# Patient Record
Sex: MALE | Race: WHITE | NOT HISPANIC OR LATINO | URBAN - METROPOLITAN AREA
[De-identification: names, ages, dates, MRNs, and addresses within clinical notes are randomized per-mention and may not be internally consistent; named-entity substitution may affect disease eponyms.]

---

## 2020-03-02 ENCOUNTER — HISTORICAL (OUTPATIENT)
Dept: ADMINISTRATIVE | Facility: HOSPITAL | Age: 65
End: 2020-03-02

## 2020-04-30 ENCOUNTER — HISTORICAL (OUTPATIENT)
Dept: RADIOLOGY | Facility: HOSPITAL | Age: 65
End: 2020-04-30

## 2020-04-30 LAB
BUN SERPL-MCNC: 20 MG/DL (ref 7–18)
CALCIUM SERPL-MCNC: 9.9 MG/DL (ref 8.5–10.1)
CHLORIDE SERPL-SCNC: 106 MMOL/L (ref 98–107)
CO2 SERPL-SCNC: 29 MMOL/L (ref 21–32)
CREAT SERPL-MCNC: 1.4 MG/DL (ref 0.6–1.3)
CREAT/UREA NIT SERPL: 14
GLUCOSE SERPL-MCNC: 156 MG/DL (ref 74–106)
POTASSIUM SERPL-SCNC: 4 MMOL/L (ref 3.5–5.1)
SODIUM SERPL-SCNC: 141 MMOL/L (ref 136–145)

## 2020-08-26 ENCOUNTER — HISTORICAL (OUTPATIENT)
Dept: ADMINISTRATIVE | Facility: HOSPITAL | Age: 65
End: 2020-08-26

## 2020-11-03 ENCOUNTER — HISTORICAL (OUTPATIENT)
Dept: ADMINISTRATIVE | Facility: HOSPITAL | Age: 65
End: 2020-11-03

## 2020-11-05 ENCOUNTER — HISTORICAL (OUTPATIENT)
Dept: SURGERY | Facility: HOSPITAL | Age: 65
End: 2020-11-05

## 2020-11-09 ENCOUNTER — HISTORICAL (OUTPATIENT)
Dept: ADMINISTRATIVE | Facility: HOSPITAL | Age: 65
End: 2020-11-09

## 2021-05-06 ENCOUNTER — HISTORICAL (OUTPATIENT)
Dept: ADMINISTRATIVE | Facility: HOSPITAL | Age: 66
End: 2021-05-06

## 2021-05-20 ENCOUNTER — HISTORICAL (OUTPATIENT)
Dept: ADMINISTRATIVE | Facility: HOSPITAL | Age: 66
End: 2021-05-20

## 2021-10-18 ENCOUNTER — HISTORICAL (OUTPATIENT)
Dept: ADMINISTRATIVE | Facility: HOSPITAL | Age: 66
End: 2021-10-18

## 2022-04-10 ENCOUNTER — HISTORICAL (OUTPATIENT)
Dept: ADMINISTRATIVE | Facility: HOSPITAL | Age: 67
End: 2022-04-10

## 2022-04-30 VITALS
HEIGHT: 74 IN | WEIGHT: 172.81 LBS | BODY MASS INDEX: 22.18 KG/M2 | BODY MASS INDEX: 24.38 KG/M2 | HEIGHT: 72 IN | DIASTOLIC BLOOD PRESSURE: 109 MMHG | BODY MASS INDEX: 23.74 KG/M2 | WEIGHT: 182.56 LBS | BODY MASS INDEX: 23.43 KG/M2 | HEIGHT: 74 IN | WEIGHT: 185 LBS | WEIGHT: 180 LBS | DIASTOLIC BLOOD PRESSURE: 94 MMHG | SYSTOLIC BLOOD PRESSURE: 144 MMHG | SYSTOLIC BLOOD PRESSURE: 156 MMHG | HEIGHT: 74 IN

## 2022-04-30 NOTE — OP NOTE
DATE OF SURGERY:    11/05/2020    SURGEON:  Marques Ryan MD    attending physician:  Marques Ryan MD    PREOPERATIVE DIAGNOSIS:  Carpometacarpal arthritis, right hand.    POSTOPERATIVE DIAGNOSIS:  Carpometacarpal arthritis, right hand.    PROCEDURES:    1. Right hand trapeziectomy.    2. K-wire suspension 1st metacarpal to 2nd metacarpal.    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  He presents for trapeziectomy and stabilization of the 1st metacarpal with K-wire.    ANESTHESIA:  General.    COMPLICATIONS:  None.    PROCEDURE IN DETAIL:  The patient was placed under LMA anesthesia, prepped and draped in the usual sterile fashion.  An Esmarch was used to exsanguinate the right upper extremity.  Tourniquet was inflated to 250 mmHg.  I made a dorsal radial incision using a 15 blade scalpel across the CMC joint of the right thumb.  The tendons were retracted.  The dorsal branch of the radial artery was retracted.  The trapezium was then sharply delineated out and a K-wire was driven into the trapezium to use as a joystick under fluoroscopic guidance.  We used a 15 blade scalpel to circumferentially isolate the trapezium.  Sharp and blunt dissection were used to remove in its entirety.  The trapezium was removed without fracturing it.  After this was completed, we then obtained a shot fluoroscopically of the removed bone and it was removed in its entirety.  We set a 4.5 K-wire and drove it from the 1st metacarpal and the 2nd metacarpal to stabilize the CMC joint and stabilize the 1st metacarpal as it healed.  Once this was done, the K-wire was then bent.  The deep tissues were closed as well as permanent suture used to close the skin.  A thumb spica     splint was applied.  The tourniquet was released.  Fingers were pink at the end of the case.  I was scrubbed and present for the key portions of the  procedure.        ______________________________  MD ÁNGEL Ashton/RADHA  DD:  11/05/2020  Time:  09:31AM  DT:  11/05/2020  Time:  09:53AM  Job #:  [id number]

## 2022-04-30 NOTE — H&P
Patient:   Mathew Wilson             MRN: [id number]            FIN: [id number]               Age:   65 years     Sex:  Male     :  1955   Associated Diagnoses:   None   Author:   Orlando Celaya      Patient is here for surgery today.  There is no changes in the history and physical exam from the previous documented H&P.  Plan to proceed with surgery as previously discussed.    Orlando Celaya

## 2022-04-30 NOTE — OP NOTE
Patient:   Mathew Wilson             MRN: [id number]            FIN: [id number]               Age:   65 years     Sex:  Male     :  1955   Associated Diagnoses:   None   Author:   Shiv Hill MD       Phacoemulsification of Cataract with Intraocular Implant   Preoperative Diagnosis: Cataract right eye   Postoperative Diagnosis : Cataract right eye  Surgeon: Shiv Hill MD  Assistant: TIFFANY Jolly  Anestheisa: MAC  Complications: None  After the patient underwent topical anesthesia along with IV sedation in the holding area, the patient was brought to the operating suite. The patient was prepped and draped in a sterile fashion. A pediatric tegaderm and a lid speculum were used to retract the upper and lower lashes and lids.  A 1.0mm paracentesis was then made at the 11 oclock position. Intraocular non-preserved 1% Xylocaine (4% diluted down to 1%) was irrigated into the anterior chamber. Trypan blue dye was used to stain the anterior capsule then Endocoat was injected into the eye. A clear corneal incision was made with a 2.4 Keratome blade. A 4.75mm circular capsulotomy was then made with a pre bent 30 gauge needle and BSS was used to hydro dissect the nucleus from the capsule. The nucleus was then phacoemulsified with the Abbott machine for a EFX of (29_). The cortex was then removed with the I/A hand piece and Helon was placed into the posterior bag of the eye. An posterior chamber implant (ZCB00_) of power (18.5_) was placed in the capsular bag. The Helon was then removed from the eye with the I/A hand piece . The anterior chamber was inflated with BSS and the wound was checked for leaks. The lid speculum was removed and a drop of Besivance was placed in the operative eye. The patient was brought to the recovery suite in stable condition.

## 2022-04-30 NOTE — OP NOTE
Patient:   Mathew Wilson             MRN: [id number]            FIN: [id number]               Age:   65 years     Sex:  Male     :  1955   Associated Diagnoses:   None   Author:   Shiv Hill MD       Phacoemulsification of Cataract with Intraocular Implant   Preoperative Diagnosis: Cataract left eye   Postoperative Diagnosis : Cataract left eye  Surgeon: Shiv Hill MD  Assistant: TIFFANY Jolly  Anestheisa: MAC  Complications: None    After the patient underwent topical anesthesia along with IV sedation in the holding area, the patient was brought to the operating suite. The patient prepped and draped in a sterile fashion. A pediatric tegaderm and a lid speculum were used to retract the upper and lower lashes and lids.  A 1.0mm paracentesis was then made at the 5 oclock and 11 oclock position. Intraocular non-preserved 1% Xylocaine (4% diluted down to 1%) was irrigated into the anterior chamber. Trypan blue dye was used to stain the anterior capsule then Endocoat was injected into the eye. A clear corneal incision was made with a 2.4 Keratome blade. A 4.75mm circular capsulotomy was then made with a pre bent 30 gauge needle and BSS was used to hydro dissect the nucleus from the capsule. The nucleus was then phacoemulsified with the Abbott machine for a EFX of (35_). The cortex was then removed with the I/A hand piece and Helon was placed into the posterior bag of the eye. An posterior chamber implant (ZCB00_) of power (18.0_) was placed in the capsular bag. The Helon was then removed from the eye with the I/A hand piece . The anterior chamber was inflated with BSS and the wound was checked for leaks. The lid speculum was removed and a drop of Besivance was placed in the operative eye. The patient was brought to the recovery suite in stable condition.

## 2022-05-02 NOTE — HISTORICAL [facility] CERNER
This is a historical note converted from Deshaun. Formatting and pictures may have been removed.  Please reference Deshaun for original formatting and attached multimedia. Chief Complaint  Post op of Left Hand/ Issues  History of Present Illness  Mr Wilson returns to clinic today?for follow-up assessment of his left?first CMC?arthritis trapeziectomy?and first metacarpal pinning. ?This is performed by Dr. Morris less than a week ago.? Patient reports that over the weekend he had swelling and some tenderness in the left hand. ?He came to clinic today for assessment?to ensure there is nothing wrong with his operation.  Review of Systems  Constitutional:?no fever, fatigue, weakness  Eye:?no vision loss, eye redness, drainage, or pain  ENMT:?no sore throat, ear pain, sinus pain/congestion, nasal congestion/drainage  Respiratory:?no cough, no wheezing, no shortness of breath  Cardiovascular:?no chest pain, no palpitations, no edema  Gastrointestinal:?no nausea, vomiting, or diarrhea. No abdominal pain  ?  ?  Physical Exam  Vitals & Measurements  HR:?108(Peripheral)? BP:?144/94?  HT:?188.00?cm? WT:?82.800?kg? BMI:?23.43?  Left hand: The patient neurovascularly intact distally in the left hand. ?Limited range of motion secondary to immobilization?and postsurgical changes. ?Wound is clean dry and intact no evidence of infection.? Single K wire in place.  ?  X-ray left hand:?Status post trapeziectomy?and pinning of the first metacarpal.? No interval change from intraoperative fluoroscopic images.  Assessment/Plan  Pain?R52  ?Patient believes that his dressing may have been put on too tight at the time of surgery?and was not accommodating his swelling. ?He reports that when the dressing was removed he felt significant pain relief.? We will replace a dressing on the patients hand today. ?Encourage patient to return to clinic if he has any other ongoing issues or concerns otherwise I would like to see him back at his  regularly scheduled postop follow-up visit.  Ordered:  XR Wrist Left Minimum 3 Views, Routine, 11/09/20 10:10:00 CST, None, Ambulatory, Rad Type, Pain, Not Scheduled, 11/09/20 10:10:00 CST  ?   Problem List/Past Medical History  Ongoing  Hypertension  Historical  No qualifying data  Procedure/Surgical History  Arthroplasty, interposition, intercarpal or carpometacarpal joints (11/05/2020)  Resection of Left Thumb Phalanx, Open Approach (11/05/2020)  Trapeziectomy (None) (11/05/2020)   Medications  Benadryl, 12.5 mg= 0.25 mL, IV, Once  cyclobenzaprine 10 mg oral tablet, 10 mg= 1 tab(s), Oral, TID  hydroCHLOROthiazide 12.5 mg oral capsule, 12.5 mg= 1 cap(s), Oral, Daily  meloxicam 15 mg oral tablet, 15 mg= 1 tab(s), Oral, Daily  Allergies  Bee Stings?(Swelling)  No Known Medication Allergies  Social History  Abuse/Neglect  No, No, Yes, 11/09/2020  No, 11/05/2020  Alcohol  Current, Beer, Wine, Liquor, 3-5 times per week, 11/05/2020  Sexual  Gender Identity Identifies as male., 05/25/2020  Substance Use  Never, 11/05/2020  Tobacco  Never (less than 100 in lifetime), No, 11/09/2020  Health Maintenance  Health Maintenance  ???Pending?(in the next year)  ??? ??OverDue  ??? ? ? ?Alcohol Misuse Screening due??01/02/20??and every 1??year(s)  ??? ? ? ?Cognitive Screening due??01/02/20??and every 1??year(s)  ??? ??Due?  ??? ? ? ?Influenza Vaccine due??10/01/20??and every 1??day(s)  ??? ? ? ?ADL Screening due??11/09/20??and every 1??year(s)  ??? ? ? ?Aspirin Therapy for CVD Prevention due??11/09/20??and every 1??year(s)  ??? ? ? ?Colorectal Screening due??11/09/20??Unknown Frequency  ??? ? ? ?Hypertension Management-Education due??11/09/20??and every 1??year(s)  ??? ? ? ?Lipid Screening due??11/09/20??Unknown Frequency  ??? ? ? ?Pneumococcal Vaccine due??11/09/20??Unknown Frequency  ??? ? ? ?Tetanus Vaccine due??11/09/20??and every 10??year(s)  ??? ? ? ?Zoster Vaccine due??11/09/20??Unknown Frequency  ??? ??Due In Future?  ??? ?  ? ?Obesity Screening not due until??01/01/21??and every 1??year(s)  ??? ? ? ?Advance Directive not due until??01/02/21??and every 1??year(s)  ??? ? ? ?Fall Risk Assessment not due until??01/02/21??and every 1??year(s)  ??? ? ? ?Functional Assessment not due until??01/02/21??and every 1??year(s)  ??? ? ? ?Hypertension Management-BMP not due until??04/30/21??and every 1??year(s)  ??? ? ? ?Blood Pressure Screening not due until??09/30/21??and every 1??year(s)  ??? ? ? ?Hypertension Management-Blood Pressure not due until??09/30/21??and every 1??year(s)  ??? ? ? ?Depression Screening not due until??10/14/21??and every 1??year(s)  ??? ? ? ?Body Mass Index Check not due until??11/03/21??and every 1??year(s)  ???Satisfied?(in the past 1 year)  ??? ??Satisfied?  ??? ? ? ?Advance Directive on??11/05/20.??Satisfied by Charlie Saeed RN  ??? ? ? ?Blood Pressure Screening on??11/09/20.??Satisfied by Viridiana Kaiser LPN  ??? ? ? ?Body Mass Index Check on??11/09/20.??Satisfied by Viridiana Kaiser LPN  ??? ? ? ?Depression Screening on??11/09/20.??Satisfied by Viridiana Kaiser LPN  ??? ? ? ?Diabetes Screening on??04/30/20.??Satisfied by Sunny Jiang  ??? ? ? ?Fall Risk Assessment on??11/09/20.??Satisfied by Viridiana Kaiser LPN  ??? ? ? ?Functional Assessment on??11/05/20.??Satisfied by Charlie Saeed RN  ??? ? ? ?Hypertension Management-Blood Pressure on??11/09/20.??Satisfied by Viridiana Kaiser LPN  ??? ? ? ?Influenza Vaccine on??11/05/20.??Satisfied by Charlie Saeed RN  ??? ? ? ?Obesity Screening on??11/09/20.??Satisfied by Viridiana Kaiser LPN  ?

## 2022-05-02 NOTE — HISTORICAL [facility] CERNER
This is a historical note converted from Deshaun. Formatting and pictures may have been removed.  Please reference Deshaun for original formatting and attached multimedia. Chief Complaint  Right wrist pain  History of Present Illness  64-year-old right-hand-dominant male. ?Longstanding history of bilateral CMC arthritis. ?He has been getting these injected in an outside orthopedist.? His last?x-rays on his right wrist?radiologist commented on concern for periosteal reaction.? He is here today for more definitive orthopedic care.  Review of Systems  CONSTITUTIONAL: No weight loss, fever, chills, weakness or fatigue.?  HEENT: Eyes: No visual loss, blurred vision, double vision or yellow sclerae. Ears, Nose, Throat: No hearing loss, sneezing, congestion, runny nose or sore throat.?  SKIN: No rash or itching.?  CARDIOVASCULAR: No chest pain, chest pressure or chest discomfort. No palpitations or edema.?  RESPIRATORY: No shortness of breath, cough or sputum.?  GASTROINTESTINAL: No anorexia, nausea, vomiting or diarrhea. No abdominal pain or blood.?  GENITOURINARY: No issues  NEUROLOGICAL: No headache, dizziness, syncope, paralysis, ataxia, numbness or tingling in the extremities. No change in bowel or bladder control.?  MUSCULOSKELETAL: No muscle, back pain, joint pain or stiffness.?  HEMATOLOGIC: No anemia, bleeding or bruising.?  LYMPHATICS: No enlarged nodes. No history of splenectomy.?  PSYCHIATRIC: No history of depression or anxiety.?  ENDOCRINOLOGIC: No reports of sweating, cold or heat intolerance. No polyuria or polydipsia.?  ALLERGIES: No history of asthma, hives, eczema or rhinitis.  Physical Exam  Vitals & Measurements  HT:?187?cm? WT:?83.91?kg? BMI:?24?  Well-appearing male no acute distress he is a regular rate by radial palpation no increased work of breathing his abdomen is benign  ?  Examination of his right upper extremity demonstrates some?pain with CMC grind he is full range of motion at the wrist and  elbow is no open wounds abrasions  ?  X-rays may be show a subtle?periosteal reaction along the radial column?otherwise unremarkable CMC arthritis  Assessment/Plan  Periosteal elevation determined by X-ray?R93.7  ?Given his radiology report with concern for periosteal reaction I feel?that weight?we have to obtain an MRI with and without contrast to rule this out.? I have ordered a BMP will get this study?he will follow-up after the imaging is been done.? Has bilateral CMC arthritis she is considering surgical intervention?I offered this to him when he is ready as well.  ?  Right wrist pain?M25.531  Ordered:  XR Wrist Right Minimum 3 Views, Routine, 03/02/20 12:28:00 CST, Inflammation, None, Ambulatory, Rad Type, Right wrist pain, Not Scheduled, 03/02/20 12:28:00 CST  ?   Problem List/Past Medical History  Ongoing  No qualifying data  Historical  No qualifying data  Medications  meloxicam 15 mg oral tablet, 15 mg= 1 tab(s), Oral, Daily  Allergies  No active allergies  Social History  Abuse/Neglect  No, No, Yes, 03/02/2020  Tobacco  Never (less than 100 in lifetime), No, 03/02/2020  Health Maintenance  Health Maintenance  ???Pending?(in the next year)  ??? ??OverDue  ??? ? ? ?Advance Directive due??01/01/20??and every 1??year(s)  ??? ? ? ?Alcohol Misuse Screening due??01/01/20??and every 1??year(s)  ??? ? ? ?Geriatric Depression Screening due??01/01/20??and every 1??year(s)  ??? ??Due?  ??? ? ? ?Cognitive Screening due??01/01/20??and every 1??year(s)  ??? ? ? ?Fall Risk Assessment due??01/01/20??and every 1??year(s)  ??? ? ? ?Functional Assessment due??01/01/20??and every 1??year(s)  ??? ? ? ?ADL Screening due??03/02/20??and every 1??year(s)  ??? ? ? ?Aspirin Therapy for CVD Prevention due??03/02/20??and every 1??year(s)  ??? ? ? ?Pneumococcal Vaccine due??03/02/20??Variable frequency  ??? ? ? ?Tetanus Vaccine due??03/02/20??and every 10??year(s)  ??? ? ? ?Zoster Vaccine due??03/02/20??and every 100??year(s)  ???  ??Due In Future?  ??? ? ? ?Obesity Screening not due until??01/01/21??and every 1??year(s)  ???Satisfied?(in the past 1 year)  ??? ??Satisfied?  ??? ? ? ?Body Mass Index Check on??03/02/20.??Satisfied by Mariela Patton MA  ??? ? ? ?Obesity Screening on??03/02/20.??Satisfied by Mariela Patton MA  ?      Follow up immediately post-MRI.  ?  Mathew Wilson?was evaluated at the time of the encounter with?Dr. Bach.? HPI, PE and treatment plan was reviewed.? Treatment plan was reasonable and necessary.??Imaging was reviewed at the time of visit.??
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
